# Patient Record
Sex: FEMALE | Race: WHITE | URBAN - NONMETROPOLITAN AREA
[De-identification: names, ages, dates, MRNs, and addresses within clinical notes are randomized per-mention and may not be internally consistent; named-entity substitution may affect disease eponyms.]

---

## 2021-02-25 ENCOUNTER — OFFICE VISIT (OUTPATIENT)
Dept: URBAN - NONMETROPOLITAN AREA CLINIC 4 | Facility: CLINIC | Age: 31
End: 2021-02-25

## 2023-08-15 ENCOUNTER — OFFICE VISIT (OUTPATIENT)
Dept: URBAN - NONMETROPOLITAN AREA CLINIC 13 | Facility: CLINIC | Age: 33
End: 2023-08-15

## 2023-08-17 ENCOUNTER — OFFICE VISIT (OUTPATIENT)
Dept: URBAN - NONMETROPOLITAN AREA CLINIC 13 | Facility: CLINIC | Age: 33
End: 2023-08-17
Payer: COMMERCIAL

## 2023-08-17 ENCOUNTER — LAB OUTSIDE AN ENCOUNTER (OUTPATIENT)
Dept: URBAN - NONMETROPOLITAN AREA CLINIC 13 | Facility: CLINIC | Age: 33
End: 2023-08-17

## 2023-08-17 VITALS
DIASTOLIC BLOOD PRESSURE: 73 MMHG | HEART RATE: 84 BPM | WEIGHT: 162.6 LBS | SYSTOLIC BLOOD PRESSURE: 106 MMHG | HEIGHT: 62 IN | BODY MASS INDEX: 29.92 KG/M2

## 2023-08-17 DIAGNOSIS — K31.84 GASTROPARESIS: ICD-10-CM

## 2023-08-17 DIAGNOSIS — R14.0 BLOATING: ICD-10-CM

## 2023-08-17 PROCEDURE — 99204 OFFICE O/P NEW MOD 45 MIN: CPT | Performed by: NURSE PRACTITIONER

## 2023-08-17 NOTE — HPI-TODAY'S VISIT:
Ms. Mariela Irwin is a 33-year-old female who presents today for worsening gastroparesis and nausea.  She reports that she was diagnosed approximately 8 years ago but is not currently on any medication.  She has an allergy to Reglan and was previously on erythromycin.  She currently has a prescription for erythromycin that she takes when her symptoms are severe but she has not taken it consistently for several years.  She was also tried on amitriptyline but felt very groggy the following day, despite taking a low-dose in the evenings.  She currently describes significant nausea, bloating, early satiety, and generalized stomach discomfort.  She denies any vomiting.  She did present to [place] last week where right upper quadrant ultrasound was performed showing cholelithiasis with possible development of cholecystitis.  HIDA scan was performed that showed ejection fraction at 37%.  MRCP was negative and liver enzymes trended down she was discharged.  Surgeon there was consulted and discussed that she could be referred for a cholecystectomy but he would advise further work-up by GI prior to doing so.  Mariela is currently taking Zofran for her nausea and reports pain is manageable with rare use of levsin.  Reports last upper EGD was approximately 8 years ago.  LG.

## 2023-08-22 ENCOUNTER — TELEPHONE ENCOUNTER (OUTPATIENT)
Dept: URBAN - NONMETROPOLITAN AREA CLINIC 2 | Facility: CLINIC | Age: 33
End: 2023-08-22

## 2023-08-23 ENCOUNTER — OFFICE VISIT (OUTPATIENT)
Dept: URBAN - NONMETROPOLITAN AREA SURGERY CENTER 1 | Facility: SURGERY CENTER | Age: 33
End: 2023-08-23

## 2023-08-25 ENCOUNTER — TELEPHONE ENCOUNTER (OUTPATIENT)
Dept: URBAN - NONMETROPOLITAN AREA CLINIC 2 | Facility: CLINIC | Age: 33
End: 2023-08-25

## 2023-08-31 ENCOUNTER — OFFICE VISIT (OUTPATIENT)
Dept: URBAN - NONMETROPOLITAN AREA CLINIC 13 | Facility: CLINIC | Age: 33
End: 2023-08-31

## 2023-10-17 ENCOUNTER — OFFICE VISIT (OUTPATIENT)
Dept: URBAN - NONMETROPOLITAN AREA CLINIC 13 | Facility: CLINIC | Age: 33
End: 2023-10-17

## 2023-10-17 ENCOUNTER — OFFICE VISIT (OUTPATIENT)
Dept: URBAN - NONMETROPOLITAN AREA SURGERY CENTER 1 | Facility: SURGERY CENTER | Age: 33
End: 2023-10-17

## 2023-12-27 ENCOUNTER — CLAIMS CREATED FROM THE CLAIM WINDOW (OUTPATIENT)
Dept: URBAN - METROPOLITAN AREA CLINIC 4 | Facility: CLINIC | Age: 33
End: 2023-12-27
Payer: COMMERCIAL

## 2023-12-27 ENCOUNTER — OFFICE VISIT (OUTPATIENT)
Dept: URBAN - NONMETROPOLITAN AREA SURGERY CENTER 1 | Facility: SURGERY CENTER | Age: 33
End: 2023-12-27
Payer: COMMERCIAL

## 2023-12-27 DIAGNOSIS — K31.89 OTHER DISEASES OF STOMACH AND DUODENUM: ICD-10-CM

## 2023-12-27 DIAGNOSIS — K31.89 ACHYLIA: ICD-10-CM

## 2023-12-27 PROCEDURE — 43239 EGD BIOPSY SINGLE/MULTIPLE: CPT | Performed by: INTERNAL MEDICINE

## 2023-12-27 PROCEDURE — 00731 ANES UPR GI NDSC PX NOS: CPT | Performed by: NURSE ANESTHETIST, CERTIFIED REGISTERED

## 2023-12-27 PROCEDURE — 88312 SPECIAL STAINS GROUP 1: CPT | Performed by: PATHOLOGY

## 2023-12-27 PROCEDURE — 88305 TISSUE EXAM BY PATHOLOGIST: CPT | Performed by: PATHOLOGY

## 2023-12-27 PROCEDURE — G8907 PT DOC NO EVENTS ON DISCHARG: HCPCS | Performed by: INTERNAL MEDICINE

## 2024-01-03 ENCOUNTER — TELEPHONE ENCOUNTER (OUTPATIENT)
Dept: URBAN - NONMETROPOLITAN AREA CLINIC 13 | Facility: CLINIC | Age: 34
End: 2024-01-03

## 2024-01-25 ENCOUNTER — OFFICE VISIT (OUTPATIENT)
Dept: URBAN - NONMETROPOLITAN AREA CLINIC 13 | Facility: CLINIC | Age: 34
End: 2024-01-25
Payer: COMMERCIAL

## 2024-01-25 VITALS
BODY MASS INDEX: 28.89 KG/M2 | WEIGHT: 157 LBS | DIASTOLIC BLOOD PRESSURE: 78 MMHG | HEART RATE: 102 BPM | SYSTOLIC BLOOD PRESSURE: 117 MMHG | HEIGHT: 62 IN

## 2024-01-25 DIAGNOSIS — R14.0 BLOATING: ICD-10-CM

## 2024-01-25 DIAGNOSIS — K31.84 GASTROPARESIS: ICD-10-CM

## 2024-01-25 DIAGNOSIS — Z90.49 S/P CHOLECYSTECTOMY: ICD-10-CM

## 2024-01-25 PROBLEM — 428882003: Status: ACTIVE | Noted: 2024-01-25

## 2024-01-25 PROBLEM — 235675006: Status: ACTIVE | Noted: 2023-08-17

## 2024-01-25 PROBLEM — 116289008: Status: ACTIVE | Noted: 2023-08-17

## 2024-01-25 PROCEDURE — 99214 OFFICE O/P EST MOD 30 MIN: CPT | Performed by: NURSE PRACTITIONER

## 2024-01-25 RX ORDER — AMITRIPTYLINE HYDROCHLORIDE 10 MG/1
1 TABLET AT BEDTIME TABLET, FILM COATED ORAL ONCE A DAY
Qty: 30 | Refills: 5 | OUTPATIENT
Start: 2024-01-25

## 2024-01-25 NOTE — HPI-TODAY'S VISIT:
Ms. Mariela Irwin returns for follow-up of gastroparesis and nausea.  She reports that she underwent cholecystectomy in September after experiencing another attack.  Since that time her nausea has significantly improved.  She has had 1 flare of gastroparesis around 3 months ago and took erythromycin with subsequent resolution of symptoms.  Weight has been stable.  She underwent upper endoscopy in December and amitriptyline for erythematous mucosa was noted in the gastric antrum but no abnormality was seen on pathology.  She is very pleased with these results.  She also thinks bloating has improved.  No further GI questions or concerns today.  LG.

## 2024-02-21 ENCOUNTER — OV EP (OUTPATIENT)
Dept: URBAN - NONMETROPOLITAN AREA CLINIC 2 | Facility: CLINIC | Age: 34
End: 2024-02-21
Payer: COMMERCIAL

## 2024-02-21 VITALS
WEIGHT: 160 LBS | SYSTOLIC BLOOD PRESSURE: 105 MMHG | HEART RATE: 86 BPM | HEIGHT: 62 IN | BODY MASS INDEX: 29.44 KG/M2 | DIASTOLIC BLOOD PRESSURE: 71 MMHG

## 2024-02-21 DIAGNOSIS — Z90.49 S/P CHOLECYSTECTOMY: ICD-10-CM

## 2024-02-21 DIAGNOSIS — K52.9 ENTEROCOLITIS: ICD-10-CM

## 2024-02-21 DIAGNOSIS — K31.84 GASTROPARESIS: ICD-10-CM

## 2024-02-21 DIAGNOSIS — R14.0 BLOATING: ICD-10-CM

## 2024-02-21 PROCEDURE — 99212 OFFICE O/P EST SF 10 MIN: CPT | Performed by: NURSE PRACTITIONER

## 2024-02-21 RX ORDER — AMITRIPTYLINE HYDROCHLORIDE 10 MG/1
1 TABLET AT BEDTIME TABLET, FILM COATED ORAL ONCE A DAY
Qty: 30 | Refills: 5 | Status: ON HOLD | COMMUNITY
Start: 2024-01-25

## 2024-02-21 NOTE — HPI-TODAY'S VISIT:
Mariela presents today for hospital f/u. Recently at [facility], [place] with diarrhea and n/v. CT scan performed consistent with "mild diarrheal illness or enterocolitis". Reports that prior to feeling sick she was started on cefdinir for a vaginal bacteria. She has since completed the medication as well as a course of ciprofloxacin. Feels well today withou n/v/diarrhea or abd pain. LG.

## 2024-02-21 NOTE — HPI-OTHER HISTORIES
8/17/2023: Ms. Mariela Irwin is a 33-year-old female who presents today for worsening gastroparesis and nausea. She reports that she was diagnosed approximately 8 years ago but is not currently on any medication. She has an allergy to Reglan and was previously on erythromycin. She currently has a prescription for erythromycin that she takes when her symptoms are severe but she has not taken it consistently for several years. She was also tried on amitriptyline but felt very groggy the following day, despite taking a low-dose in the evenings. She currently describes significant nausea, bloating, early satiety, and generalized stomach discomfort. She denies any vomiting. She did present to [place] last week where right upper quadrant ultrasound was performed showing cholelithiasis with possible development of cholecystitis. HIDA scan was performed that showed ejection fraction at 37%. MRCP was negative and liver enzymes trended down she was discharged. Surgeon there was consulted and discussed that she could be referred for a cholecystectomy but he would advise further work-up by GI prior to doing so. Mariela is currently taking Zofran for her nausea and reports pain is manageable with rare use of levsin. Reports last upper EGD was approximately 8 years ago. LG.  EGD 12/2023 - erythematous mucosa in stomach, no abnormality on pathology  1/25/2024: Ms. Mariela Irwin returns for follow-up of gastroparesis and nausea. She reports that she underwent cholecystectomy in September after experiencing another attack. Since that time her nausea has significantly improved. She has had 1 flare of gastroparesis around 3 months ago and took erythromycin with subsequent resolution of symptoms. Weight has been stable. She underwent upper endoscopy in December and erythematous mucosa was noted in the gastric antrum but no abnormality was seen on pathology. She is very pleased with these results. She also thinks bloating has improved. No further GI questions or concerns today. LG.

## 2025-02-07 ENCOUNTER — OFFICE VISIT (OUTPATIENT)
Dept: URBAN - NONMETROPOLITAN AREA CLINIC 2 | Facility: CLINIC | Age: 35
End: 2025-02-07

## 2025-06-12 ENCOUNTER — DASHBOARD ENCOUNTERS (OUTPATIENT)
Age: 35
End: 2025-06-12

## 2025-06-12 ENCOUNTER — OFFICE VISIT (OUTPATIENT)
Dept: URBAN - METROPOLITAN AREA CLINIC 54 | Facility: CLINIC | Age: 35
End: 2025-06-12
Payer: COMMERCIAL

## 2025-06-12 DIAGNOSIS — Z90.49 S/P CHOLECYSTECTOMY: ICD-10-CM

## 2025-06-12 DIAGNOSIS — R14.0 BLOATING: ICD-10-CM

## 2025-06-12 DIAGNOSIS — K31.84 GASTROPARESIS: ICD-10-CM

## 2025-06-12 PROCEDURE — 99204 OFFICE O/P NEW MOD 45 MIN: CPT

## 2025-06-12 PROCEDURE — 99214 OFFICE O/P EST MOD 30 MIN: CPT

## 2025-06-12 RX ORDER — ONDANSETRON 8 MG/1
1 TABLET ON THE TONGUE AND ALLOW TO DISSOLVE  AS NEEDED TABLET, ORALLY DISINTEGRATING ORAL ONCE A DAY
Qty: 30
Start: 2024-03-26

## 2025-06-12 RX ORDER — PANTOPRAZOLE SODIUM 40 MG/1
1 TABLET 1/2 TO 1 HOUR BEFORE MORNING MEAL TABLET, DELAYED RELEASE ORAL ONCE A DAY
Status: ACTIVE | COMMUNITY

## 2025-06-12 RX ORDER — ERYTHROMYCIN 500 MG/1
ONE TABLET TABLET, DELAYED RELEASE ORAL
Qty: 90 | Refills: 1 | OUTPATIENT
Start: 2025-06-12 | End: 2025-08-11

## 2025-06-12 RX ORDER — MELOXICAM 15 MG/1
1 TABLET TABLET ORAL ONCE A DAY
Status: ACTIVE | COMMUNITY

## 2025-06-12 RX ORDER — HYDROCODONE BITARTRATE AND ACETAMINOPHEN 7.5; 325 MG/1; MG/1
1 TABLET AS NEEDED TABLET ORAL
Status: ACTIVE | COMMUNITY

## 2025-06-12 RX ORDER — HYDROCHLOROTHIAZIDE 25 MG/1
1 TABLET IN THE MORNING TABLET ORAL ONCE A DAY
Status: ACTIVE | COMMUNITY

## 2025-06-12 NOTE — HPI-OTHER HISTORIES
8/17/2023: Ms. Mariela Irwin is a 33-year-old female who presents today for worsening gastroparesis and nausea. She reports that she was diagnosed approximately 8 years ago but is not currently on any medication. She has an allergy to Reglan and was previously on erythromycin. She currently has a prescription for erythromycin that she takes when her symptoms are severe but she has not taken it consistently for several years. She was also tried on amitriptyline but felt very groggy the following day, despite taking a low-dose in the evenings. She currently describes significant nausea, bloating, early satiety, and generalized stomach discomfort. She denies any vomiting. She did present to [place] last week where right upper quadrant ultrasound was performed showing cholelithiasis with possible development of cholecystitis. HIDA scan was performed that showed ejection fraction at 37%. MRCP was negative and liver enzymes trended down she was discharged. Surgeon there was consulted and discussed that she could be referred for a cholecystectomy but he would advise further work-up by GI prior to doing so. Mariela is currently taking Zofran for her nausea and reports pain is manageable with rare use of levsin. Reports last upper EGD was approximately 8 years ago. LG.  EGD 12/2023 - erythematous mucosa in stomach, no abnormality on pathology  1/25/2024: Ms. Mariela Irwin returns for follow-up of gastroparesis and nausea. She reports that she underwent cholecystectomy in September after experiencing another attack. Since that time her nausea has significantly improved. She has had 1 flare of gastroparesis around 3 months ago and took erythromycin with subsequent resolution of symptoms. Weight has been stable. She underwent upper endoscopy in December and erythematous mucosa was noted in the gastric antrum but no abnormality was seen on pathology. She is very pleased with these results. She also thinks bloating has improved. No further GI questions or concerns today. LG.  2/21/2024: Mariela presents today for hospital f/u. Recently at [facility] with diarrhea and n/v. CT scan performed consistent with "mild diarrheal illness or enterocolitis". Reports that prior to feeling sick she was started on cefdinir for a vaginal bacteria. She has since completed the medication as well as a course of ciprofloxacin. Feels well today withou n/v/diarrhea or abd pain. LG.  6/12/25: Patient presents today complaining of gastroparesis flare up x 1 week. Reports postprandial nausea, epigastric pain, and bloating. Had vomiting on Monday for 3-4 hours. Taking Zofran 8mg a few times a day without good control of nausea. Usually uses erythromycin prn but is out. Prior to this flare she had been doing well.

## 2025-06-13 ENCOUNTER — TELEPHONE ENCOUNTER (OUTPATIENT)
Dept: URBAN - METROPOLITAN AREA CLINIC 54 | Facility: CLINIC | Age: 35
End: 2025-06-13